# Patient Record
Sex: MALE | Race: OTHER
[De-identification: names, ages, dates, MRNs, and addresses within clinical notes are randomized per-mention and may not be internally consistent; named-entity substitution may affect disease eponyms.]

---

## 2019-01-15 ENCOUNTER — HOSPITAL ENCOUNTER (EMERGENCY)
Dept: HOSPITAL 25 - ED | Age: 64
Discharge: HOME | End: 2019-01-15
Payer: COMMERCIAL

## 2019-01-15 VITALS — SYSTOLIC BLOOD PRESSURE: 142 MMHG | DIASTOLIC BLOOD PRESSURE: 82 MMHG

## 2019-01-15 DIAGNOSIS — Z87.891: ICD-10-CM

## 2019-01-15 DIAGNOSIS — R06.02: ICD-10-CM

## 2019-01-15 DIAGNOSIS — I10: Primary | ICD-10-CM

## 2019-01-15 LAB
ALBUMIN SERPL BCG-MCNC: 3.6 G/DL (ref 3.2–5.2)
ALBUMIN/GLOB SERPL: 1.3 {RATIO} (ref 1–3)
ALP SERPL-CCNC: 53 U/L (ref 34–104)
ALT SERPL W P-5'-P-CCNC: 23 U/L (ref 7–52)
ANION GAP SERPL CALC-SCNC: 7 MMOL/L (ref 2–11)
AST SERPL-CCNC: 26 U/L (ref 13–39)
BASOPHILS # BLD AUTO: 0 10^3/UL (ref 0–0.2)
BUN SERPL-MCNC: 16 MG/DL (ref 6–24)
BUN/CREAT SERPL: 13.1 (ref 8–20)
CALCIUM SERPL-MCNC: 8.9 MG/DL (ref 8.6–10.3)
CHLORIDE SERPL-SCNC: 106 MMOL/L (ref 101–111)
CK SERPL-CCNC: 194 U/L (ref 10–223)
EOSINOPHIL # BLD AUTO: 0.1 10^3/UL (ref 0–0.6)
GLOBULIN SER CALC-MCNC: 2.8 G/DL (ref 2–4)
GLUCOSE SERPL-MCNC: 189 MG/DL (ref 70–100)
HCO3 SERPL-SCNC: 28 MMOL/L (ref 22–32)
HCT VFR BLD AUTO: 44 % (ref 42–52)
HGB BLD-MCNC: 14.4 G/DL (ref 14–18)
LYMPHOCYTES # BLD AUTO: 1.9 10^3/UL (ref 1–4.8)
MCH RBC QN AUTO: 28 PG (ref 27–31)
MCHC RBC AUTO-ENTMCNC: 33 G/DL (ref 31–36)
MCV RBC AUTO: 83 FL (ref 80–94)
MONOCYTES # BLD AUTO: 0.6 10^3/UL (ref 0–0.8)
NEUTROPHILS # BLD AUTO: 5.9 10^3/UL (ref 1.5–7.7)
NRBC # BLD AUTO: 0 10^3/UL
NRBC BLD QL AUTO: 0
PLATELET # BLD AUTO: 241 10^3/UL (ref 150–450)
POTASSIUM SERPL-SCNC: 3.2 MMOL/L (ref 3.5–5)
PROT SERPL-MCNC: 6.4 G/DL (ref 6.4–8.9)
RBC # BLD AUTO: 5.22 10^6/UL (ref 4–5.4)
SODIUM SERPL-SCNC: 141 MMOL/L (ref 135–145)
WBC # BLD AUTO: 8.6 10^3/UL (ref 3.5–10.8)

## 2019-01-15 PROCEDURE — 99283 EMERGENCY DEPT VISIT LOW MDM: CPT

## 2019-01-15 PROCEDURE — 96375 TX/PRO/DX INJ NEW DRUG ADDON: CPT

## 2019-01-15 PROCEDURE — 83605 ASSAY OF LACTIC ACID: CPT

## 2019-01-15 PROCEDURE — 82553 CREATINE MB FRACTION: CPT

## 2019-01-15 PROCEDURE — 93005 ELECTROCARDIOGRAM TRACING: CPT

## 2019-01-15 PROCEDURE — 96374 THER/PROPH/DIAG INJ IV PUSH: CPT

## 2019-01-15 PROCEDURE — 86140 C-REACTIVE PROTEIN: CPT

## 2019-01-15 PROCEDURE — 84484 ASSAY OF TROPONIN QUANT: CPT

## 2019-01-15 PROCEDURE — 83880 ASSAY OF NATRIURETIC PEPTIDE: CPT

## 2019-01-15 PROCEDURE — 85379 FIBRIN DEGRADATION QUANT: CPT

## 2019-01-15 PROCEDURE — 80053 COMPREHEN METABOLIC PANEL: CPT

## 2019-01-15 PROCEDURE — 82550 ASSAY OF CK (CPK): CPT

## 2019-01-15 PROCEDURE — 36415 COLL VENOUS BLD VENIPUNCTURE: CPT

## 2019-01-15 PROCEDURE — 85025 COMPLETE CBC W/AUTO DIFF WBC: CPT

## 2019-01-15 PROCEDURE — 71046 X-RAY EXAM CHEST 2 VIEWS: CPT

## 2019-01-15 RX ADMIN — HYDRALAZINE HYDROCHLORIDE ONE MG: 20 INJECTION INTRAMUSCULAR; INTRAVENOUS at 08:00

## 2019-01-15 RX ADMIN — HYDRALAZINE HYDROCHLORIDE ONE MG: 20 INJECTION INTRAMUSCULAR; INTRAVENOUS at 08:27

## 2019-01-15 NOTE — ED
Hypertension





- HPI Summary


HPI Summary: 





This pt is a 62 y/o [facility]/o high blood pressure today. He 

states he has hx of hypertension and has been compliant with medications, 

taking his medications daily including this morning. Additionally pt reports 

that after going up the stairs this morning he felt out of breath. He notes he 

took his blood pressure and it was 130/190. Normally his blood pressure is 

between 110-145 systolic. Denies chest pain, nausea, vomiting, blurry vision, 

visual disturbance, dizziness.


Pt has an upcoming appointment with his PCP, Dr. Mcrae. 





- History of Current Complaint


Chief Complaint: EDHypertension


Stated Complaint: HIGH BP


Time Seen by Provider: 01/15/19 07:33


Hx Obtained From: Patient


Onset/Duration: Started Hours Ago, Still Present


Timing: Lasting Hours


Aggravating Factor(s): Exertion


Alleviating Factor(s): Nothing


Associated Signs & Symptoms: SOB





- Allergies/Home Medications


Allergies/Adverse Reactions: 


 Allergies











Allergy/AdvReac Type Severity Reaction Status Date / Time


 


No Known Allergies Allergy   Verified 01/15/19 07:21














PMH/Surg Hx/FS Hx/Imm Hx


Endocrine/Hematology History: 


   Denies: Hx Diabetes


Cardiovascular History: Reports: Hx Hypertension


   Denies: Hx Congestive Heart Failure, Hx Hypercholesterolemia


 History: 


   Denies: Hx Renal Disease


Infectious Disease History: No


Infectious Disease History: 


   Denies: Traveled Outside the [place] in Last 30 Days





- Family History


Known Family History: Positive: Cardiac Disease - father with CHF





- Social History


Alcohol Use: Rare


Substance Use Type: Reports: None


Smoking Status (MU): Former Smoker





Review of Systems


Negative: Fever, Chills


Negative: Blurred Vision


Cardiovascular: Other - POS: hypertension


Negative: Chest Pain


Positive: Shortness Of Breath


Negative: Vomiting, Nausea


Neurological: Other - NEG: dizziness


All Other Systems Reviewed And Are Negative: Yes





Physical Exam





- Summary


Physical Exam Summary: 





VITAL SIGNS: Reviewed.


GENERAL: Patient is a well-developed and nourished male who is lying 

comfortable in the stretcher. Patient is not in any acute respiratory distress.


HEAD AND FACE: No signs of trauma. No ecchymosis, hematomas or skull 

depressions. No sinus tenderness.


EYES: PERRLA, EOMI x 2, No injected conjunctiva, no nystagmus.


EARS: Hearing grossly intact. Ear canals and tympanic membranes are within 

normal limits.


MOUTH: Oropharynx within normal limits.


NECK: Supple, trachea is midline, no adenopathy, no JVD, no carotid bruit, no c-

spine tenderness, neck with full ROM.


CHEST: Symmetric, no tenderness at palpation


LUNGS: Clear to auscultation bilaterally. No wheezing or crackles.


CVS: Regular rate and rhythm, S1 and S2 present, no murmurs or gallops 

appreciated.


ABDOMEN: Soft, non-tender. No signs of distention. No rebound, no guarding, and 

no masses palpated. Bowel sounds are normal.


EXTREMITIES: FROM in all major joints, no edema, no cyanosis or clubbing.


NEURO: Alert and oriented x 3. No acute neurological deficits. Speech is normal 

and follows commands.


SKIN: Dry and warm


Triage Information Reviewed: Yes


Vital Signs On Initial Exam: 


 Initial Vitals











Temp Pulse Resp BP Pulse Ox


 


 98 F   78   16   171/110   96 


 


 01/15/19 07:18  01/15/19 07:18  01/15/19 07:18  01/15/19 07:18  01/15/19 07:18











Vital Signs Reviewed: Yes





Diagnostics





- Vital Signs


 Vital Signs











  Temp Pulse Resp BP Pulse Ox


 


 01/15/19 07:25   79   184/103  96


 


 01/15/19 07:18  98 F  78  16  171/110  96














- Laboratory


Result Diagrams: 


 01/15/19 08:05





 01/15/19 08:05


Lab Statement: Any lab studies that have been ordered have been reviewed, and 

results considered in the medical decision making process.





- Radiology


  ** Chest XR


Radiology Interpretation Completed By: Radiologist


Summary of Radiographic Findings: IMPRESSION: No active cardiopulmonary 

disease. Dr. Chen has reviewed this report.





- EKG


  ** 07:48


Cardiac Rate: NL - at 66 bpm


EKG Rhythm: Sinus Rhythm


EKG Comparison: No Significant Change - compared to prior EKG on 9/26/14.


Summary of EKG Findings: No ST elevations.





Re-Evaluation





- Re-Evaluation


  ** First Eval


Re-Evaluation Time: 09:20


Comment: I reviewed the results with the pt. He will be discharged home.





Hypertension Course/Dx





- Course


Assessment/Plan: This pt is a 62 y/o male presenting to [facility] c/o high blood 

pressure today. He states he has hx of hypertension and has been compliant with 

medications, taking his medications daily including this morning. Additionally 

pt reports that after going up the stairs this morning he felt out of breath. 

He notes he took his blood pressure and it was 130/190. Normally his blood 

pressure is between 110-145 systolic. Denies chest pain, nausea, vomiting, 

blurry vision, visual disturbance, dizziness.  Pt has an upcoming appointment 

with his PCP, Dr. Mcrae.  Blood tests without any significant abnormality 

except for potassium level of 3.2 creatinine 1.22 and glucose 189.  Troponin 

0.00 and a BNP is only 73.  D-dimer is 203 therefore I have no suspicion for an 

acute coronary syndrome or pulmonary embolism.  In the ED course the patient 

was given hydralazine for the hypertension and another blood pressure is 157/

96.  Chest x-ray impression: No active cardiopulmonary disease.  EKG shows a 

normal sinus rhythm without any sedation.  Since the patients symptoms have 

resolved I will discharge the patient home with follow-up with Dr. Mcrae.  I 

discussed all the findings and test results with the patient. Patient was 

instructed to return to the emergency room immediately if any of the symptoms 

return or worsens. Plan of care was discussed with the patient and understands 

and agrees. All questions were answered at patient satisfaction.  There were no 

further complaints or concerns. Lung exam before discharge: CTA B/L. Good air 

exchange. No wheezing or crackles heard. CVS: S1 and S2 present. No murmurs 

appreciated. Patient is alert and oriented x 3. Patient is hemodynamically 

stable. Patient will be discharged home with follow up PCP in the next 2-3 day.





- Diagnoses


Differential Diagnosis/HQI PQRI: Hypertension, Hypertensive Crisis


Provider Diagnoses: 


 Uncontrolled hypertension








Discharge





- Sign-Out/Discharge


Documenting (check all that apply): Patient Departure - Discharge home





- Discharge Plan


Condition: Stable


Disposition: HOME


Patient Education Materials:  Hypertension (ED)


Referrals: 


Julio Mcrae MD [Primary Care Provider] - 


Additional Instructions: 


FOLLOW UP WITH YOUR PRIMARY CARE PROVIDER WITHIN ONE WEEK FOR HIGH BLOOD 

PRESSURE NOTED TODAY.





RETURN TO THE ED FOR ANY NEW OR WORSENING SYMPTOMS.





- Billing Disposition and Condition


Condition: STABLE


Disposition: Home





- Attestation Statements


Document Initiated by Scribe: Yes


Documenting Scribe: Yolanda Medina


Provider For Whom Scribe is Documenting (Include Credential): Servando Chen MD


Scribe Attestation: 


Yolanda BECERRA, scribed for Servando Chen MD on 01/16/19 at 0713. 


Scribe Documentation Reviewed: Yes


Provider Attestation: 


The documentation as recorded by the scribe, Yolanda Medina accurately reflects 

the service I personally performed and the decisions made by me, Servando Chen MD


Status of Scribe Document: Viewed

## 2019-01-15 NOTE — XMS REPORT
Continuity of Care Document (CCD)

 Created on:2018



Patient:Donta Hoffmann

Sex:Male

:1955

External Reference #:[id number]





Demographics







 Address  [address] [place] [address]

 

 Mobile Phone  [phone]

 

 Email Address  [email or web address]

 

 Preferred Language  en

 

 Marital Status   or 

 

 Worship Affiliation  Unknown

 

 Race  Unknown

 

 Additional Race(s)  Other Race

 

 Ethnic Group   or 









Author







 Name  Sara Morgan









Support







 Name  Relationship  Address  Phone

 

 Marcio Yip  Unavailable  Unavailable  [phone]









Care Team Providers







 Name  Role  Phone

 

 Julio Mcrae MD  Primary Care Physician  Unavailable









Payers







 Type  Date  Identification Numbers  Payment Provider  Subscriber

 

   Expires: 2017  Policy Number: [id number]  [employer]  Donta Hoffmann









 Group Number: [id number]  [address] [address]

 

 PayID: [id number]  [place] [address]









     Policy Number: [id number]  [facility]  Donta Hoffmann









 PayID: [id number]  [address]









 [place] [address]







Advance Directives







 Description

 

 No Information Available







Problems







 Description

 

 No Information







Family History







 Date  Family Member(s)  Problem(s)  Comments

 

   Father   due to Heart  ()



     complicatons  

 

   Father  Heart Disease  

 

   Mother  Dementia  

 

   Mother   due to Compications of  ()



     Dementia  

 

   Mother   due to Natural Causes  ()

 

   Siblings  3  1 brother , Stomach



       Cancer 1 living  brother, hx



       of Colon Cancer 1 living



       sister, hx of Rheumatic fever







Social History







 Type  Date  Description  Comments

 

 Birth Sex    Unknown  

 

 Marital Status      

 

 Lives With    Spouse  

 

 Occupation    Clinician  

 

 Tobacco Use  Start: Unknown  Never Smoked Cigarettes  

 

 ETOH Use    Denies alcohol use  

 

 Tobacco Use  Start: Unknown  Patient has never smoked  

 

 Recreational Drug Use    Denies Drug Use  

 

 Smoking Status  Reviewed: 18  Patient has never smoked  

 

 Exercise Type/Frequency    Exercises regularly  Hiking, biking







Allergies, Adverse Reactions, Alerts







 Description

 

 No Known Drug Allergies







Medications







 Medication  Date  Status  Form  Strength  Qnty  SIG  Indications  Ordering



                 Provider

 

 Bystolic  00/00/  Active  Tablets  10mg    1 by    Unknown



   0000          mouth    



             once a    



             day    

 

 Alfuzosin HCL  /  Active  Tablets ER  10mg    1 by    Unknown



 ER  0000    24HR      mouth    



             every day    

 

                 

 

 Pravastatin  /  Hx  Tablets  40mg    1 tablet    Unknown



 Sodium  0000 -          daily at    



   12/04/          bedtime    



   2018              

 

 Ipratropium  /  Hx  Solution  0.06%    2 sprays    Unknown



 Bromide  0000          in each    



             nostril 3    



             times    



             daily as    



             needed    

 

 Fluticasone  /  Hx  Suspension  50mcg/Act    2 sprays    Unknown



 Propionate  0000 -          each    



   /          nostril    



   2018          daily as    



             needed    

 

 Valsartan  /  Hx  Tablets  320mg    1 by    Unknown



   0000 -          mouth    



             every day    



                 

 

 Dyrenium  /  Hx  Capsules  100mg    twice    Unknown



   0000 -          daily    



   2018              







Immunizations







 Description

 

 No Information Available







Vital Signs







 Date  Vital  Result  Comment

 

 2018  8:44am  Height  68.5 inches  5'8.50"









 Weight  230.12 lb  

 

 Heart Rate  76 /min  

 

 BP Systolic Sitting  168 mmHg  Rue large cuff

 

 BP Diastolic Sitting  98 mmHg  Rue large cuff

 

 Respiratory Rate  16 /min  

 

 O2 % BldC Oximetry  97 %  On Ra

 

 BMI (Body Mass Index)  34.5 kg/m2  









 2018  8:15am  Height  68.5 inches  5'8.50"









 Weight  229.00 lb  

 

 Heart Rate  68 /min  

 

 BP Systolic Sitting  146 mmHg  

 

 BP Diastolic Sitting  90 mmHg  

 

 Respiratory Rate  14 /min  

 

 O2 % BldC Oximetry  95 %  

 

 BMI (Body Mass Index)  34.3 kg/m2  

 

 Neck Circumference in inches  17.5  









 2017  3:18pm  Heart Rate  78 /min  









 Respiratory Rate  16 /min  

 

 Body Temperature  97.7 F  









 10/27/2017  2:00pm  Height  68 inches  5'8"









 Weight  221.00 lb  

 

 Heart Rate  78 /min  

 

 BP Systolic  180 mmHg  

 

 BP Diastolic  98 mmHg  

 

 Respiratory Rate  16 /min  

 

 Body Temperature  97.8 F  

 

 BMI (Body Mass Index)  33.6 kg/m2  







Results







 Test  Date  Facility  Test  Result  H/L  Range  Note

 

 Laboratory test  10/27/2017  [facility]  Surgical  SEE RESULT      1
, 2



 finding    [address]  Pathology  [place]      



     [place] [address] [phone]          









 1  [id number]

 

 2  SEE RESULT BELOW



   -----------------------------------------------------------------------------
---------------



   Name:  DONTA HOFFMANN                  : 1955    Attend Dr: Jone Arteaga MD



   Acct:  [id number]  Unit: [id number]  AGE: 62            Location:  [facility]



   Reg:   10/27/17                        SEX: M             Status:    REG REF



   -----------------------------------------------------------------------------
---------------



   



   SPEC: [id number]            LOWELL: 10/27/      SUBM DR: Jone Arteaga MD



   REQ:  [id number]             RECD: 10/27/



   STATUS: OLIVIA STROUD DR: Julio Mcrae MD



   _



   ORDERED:  LEVEL 4



   COMMENTS: [id number]



   



   FINAL DIAGNOSIS



   



   



   Skin, left leg, excision:



   -- Dermatofibroma, ulcerated.



   -- All margins are clear.



   



   



   



   CLINICAL HISTORY



   



   No history given



   



   PRE-OPERATIVE DIAGNOSIS



   



   



   GROSS DESCRIPTION



   



   The specimen is received in formalin labeled, Left Leg Skin Lesion, and 
consists of a 2.4 x



   1.0 cm brown-gray wrinkled hairbearing unoriented skin ellipse excised to a 
maximum depth of



   0.7 cm with a central 0.6 a 0.5 x 0.1 brown-black scabrous lesion.  The 
specimen is inked,



   serially sectioned and entirely submitted in cassettes A through D to 
include ellipse ends



   in cassette A.



   



   Signed __________(signature on file)___________ Zoe Waterman MD  1016



   



   -----------------------------------------------------------------------------
---------------



   



   



   



   



   



   



   



   



   



   ** END OF REPORT **



   



   * ML=Testing performed at Main Lab



   DEPARTMENT OF PATHOLOGY,  [address] [place] [address]



   Phone # [phone]      Fax #[phone]



   Venkatesh Carter M.D. Director     [facility] # [id number]







Procedures







 Date  Code  Description  Status

 

 2018  01685  Sleep Study Unattended,HRT Rate,Oxygen Sat,Resp  Completed



     Effort/Airflow  

 

 10/27/2017  85243  Excise Benign Lesion 2.1-3CM Trunk/Arm/Leg  Completed

 

 2017  95698  Moderate Sedation Services; Same Phys Each Additional  
Completed



     15 Mins  

 

 2017  66335  Moderate Sedation Services; Same Phys Intl 15 Mins; PT  
Completed



     >=5 Years  

 

 2017  [id number]  Colonoscopy  Completed

 

 2017  85229364  Colonoscopy  Completed







Encounters







 Type  Date  Location  Provider  Dx  Diagnosis

 

 Office Visit  2018  [facility]  Vanessa Cheek MD  R06.83  
Snoring



   8:30a  [facility]      









 G47.00  Insomnia, unspecified

 

 E66.09  Other obesity due to excess calories

 

 Z68.34  Body mass index (BMI) 34.0-34.9, adult







Plan of Treatment

Future Appointment(s):2019  9:15 am - Desiree Mackey DNP, RN, FNP-BC at 
[facility]2018 - Desiree Mackey DNP, RN, FNP-
BCG47.33 Obstructive sleep apnea (adult) (pediatric)New Orders:[facility]Home[facility], 
Ordered: 18Comments:Sleep Apnea - 18 HST  AHI 8.3/hour supine 15.4/
hour, april O2 88%Follow up:6 weeksRecommendations:Sleep apnea to start PAP at  
5-15  cm Review of sleep study in detail. Review of risks of untreated sleep 
apnea including cardiovascular events: rhythm irregularities, heart attack, 
stroke; gastro esophageal reflux disease (GERD); diabetes; anxiety, depression; 
high blood pressure; accidents (machinery and automobile)  Recommendation for 
PAP other treatment modalities NON-PAP including oral appliance/mandibular 
advancement device, positional strategies , and surgery.  Referral for PAP 
device to be sent to [facility]        phone:  [phone] 
Equipment appointment will take about 45 minutes, the DME provider will call 
you  within 5 days to set you up for the device.  If you do not hear from them 
call the Sleep Center. The mask will have a 30-day guarantee, if you have mask 
problems call the DME provider to have a fitting for a different mask. Need 
distilled water for humidifier CPAPmask and tubing Cleaning Wipe off mask daily 
(baby wipe-no scent, or warm water) Clean mask, tubing,filter, and water 
chamber weekly in mild no scent dish soap and water. Hang to dry.    If you 
have problems with the air pressure call the sleep center and speak to a nurse.
       If you have any further questions, please call the [facility] 
at [phone].  If you have any sleepiness while driving you MUST avoid 
operating a vehicle or machinery.Z68.34 Body mass index (BMI) 34.0-34.9, 
adultRecommendations:Continue with efforts for weight loss and exercise